# Patient Record
Sex: FEMALE | Race: BLACK OR AFRICAN AMERICAN | NOT HISPANIC OR LATINO | URBAN - METROPOLITAN AREA
[De-identification: names, ages, dates, MRNs, and addresses within clinical notes are randomized per-mention and may not be internally consistent; named-entity substitution may affect disease eponyms.]

---

## 2019-05-24 ENCOUNTER — OFFICE VISIT (OUTPATIENT)
Dept: INTERNAL MEDICINE | Facility: CLINIC | Age: 24
End: 2019-05-24

## 2019-05-24 VITALS
HEIGHT: 68 IN | DIASTOLIC BLOOD PRESSURE: 90 MMHG | RESPIRATION RATE: 16 BRPM | HEART RATE: 54 BPM | WEIGHT: 231.6 LBS | TEMPERATURE: 97.4 F | BODY MASS INDEX: 35.1 KG/M2 | OXYGEN SATURATION: 94 % | SYSTOLIC BLOOD PRESSURE: 138 MMHG

## 2019-05-24 DIAGNOSIS — R10.11 RIGHT UPPER QUADRANT ABDOMINAL PAIN: Primary | ICD-10-CM

## 2019-05-24 DIAGNOSIS — K21.9 GASTROESOPHAGEAL REFLUX DISEASE, ESOPHAGITIS PRESENCE NOT SPECIFIED: ICD-10-CM

## 2019-05-24 LAB
ALBUMIN SERPL-MCNC: 3.7 G/DL (ref 3.5–5.2)
ALBUMIN/GLOB SERPL: 1.3 G/DL
ALP SERPL-CCNC: 46 U/L (ref 39–117)
ALT SERPL W P-5'-P-CCNC: 14 U/L (ref 1–33)
ANION GAP SERPL CALCULATED.3IONS-SCNC: 13.8 MMOL/L
AST SERPL-CCNC: 15 U/L (ref 1–32)
BILIRUB SERPL-MCNC: 0.3 MG/DL (ref 0.2–1.2)
BUN BLD-MCNC: 4 MG/DL (ref 6–20)
BUN/CREAT SERPL: 4.8 (ref 7–25)
CALCIUM SPEC-SCNC: 9.3 MG/DL (ref 8.6–10.5)
CHLORIDE SERPL-SCNC: 98 MMOL/L (ref 98–107)
CO2 SERPL-SCNC: 24.2 MMOL/L (ref 22–29)
CREAT BLD-MCNC: 0.83 MG/DL (ref 0.57–1)
DEPRECATED RDW RBC AUTO: 45.6 FL (ref 37–54)
ERYTHROCYTE [DISTWIDTH] IN BLOOD BY AUTOMATED COUNT: 13.5 % (ref 12.3–15.4)
GFR SERPL CREATININE-BSD FRML MDRD: 102 ML/MIN/1.73
GLOBULIN UR ELPH-MCNC: 2.8 GM/DL
GLUCOSE BLD-MCNC: 80 MG/DL (ref 65–99)
HCT VFR BLD AUTO: 46.3 % (ref 34–46.6)
HGB BLD-MCNC: 14 G/DL (ref 12–15.9)
LIPASE SERPL-CCNC: 29 U/L (ref 13–60)
MCH RBC QN AUTO: 28.1 PG (ref 26.6–33)
MCHC RBC AUTO-ENTMCNC: 30.2 G/DL (ref 31.5–35.7)
MCV RBC AUTO: 92.8 FL (ref 79–97)
PLATELET # BLD AUTO: 270 10*3/MM3 (ref 140–450)
PMV BLD AUTO: 11.7 FL (ref 6–12)
POTASSIUM BLD-SCNC: 4.2 MMOL/L (ref 3.5–5.2)
PROT SERPL-MCNC: 6.5 G/DL (ref 6–8.5)
RBC # BLD AUTO: 4.99 10*6/MM3 (ref 3.77–5.28)
SODIUM BLD-SCNC: 136 MMOL/L (ref 136–145)
WBC NRBC COR # BLD: 5.87 10*3/MM3 (ref 3.4–10.8)

## 2019-05-24 PROCEDURE — 85027 COMPLETE CBC AUTOMATED: CPT | Performed by: NURSE PRACTITIONER

## 2019-05-24 PROCEDURE — 83690 ASSAY OF LIPASE: CPT | Performed by: NURSE PRACTITIONER

## 2019-05-24 PROCEDURE — 80053 COMPREHEN METABOLIC PANEL: CPT | Performed by: NURSE PRACTITIONER

## 2019-05-24 PROCEDURE — 99203 OFFICE O/P NEW LOW 30 MIN: CPT | Performed by: NURSE PRACTITIONER

## 2019-05-24 RX ORDER — OMEPRAZOLE 20 MG/1
20 CAPSULE, DELAYED RELEASE ORAL DAILY
COMMUNITY
End: 2019-05-24 | Stop reason: SDUPTHER

## 2019-05-24 RX ORDER — ONDANSETRON 4 MG/1
TABLET, ORALLY DISINTEGRATING ORAL
Refills: 0 | COMMUNITY
Start: 2019-05-23 | End: 2019-10-09 | Stop reason: HOSPADM

## 2019-05-24 RX ORDER — OMEPRAZOLE 20 MG/1
20 CAPSULE, DELAYED RELEASE ORAL DAILY
Qty: 30 CAPSULE | Refills: 1 | OUTPATIENT
Start: 2019-05-24 | End: 2019-10-09 | Stop reason: HOSPADM

## 2019-05-24 NOTE — PROGRESS NOTES
Chavez Manriquez is a 23 y.o. female.     Chief Complaint   Patient presents with   • Establish Care   • Abdominal Pain     sharp pain under right breast, stomach area.  2 days.  Went to [facility] ([facility]) yesterrday.  Given zofran and Prilosec       Abdominal Pain   This is a new problem. The current episode started in the past 7 days (2 days). The problem has been waxing and waning. The pain is located in the RUQ. The pain is at a severity of 5/10. The quality of the pain is colicky and cramping. The abdominal pain does not radiate. Associated symptoms include belching, flatus, nausea and vomiting ( Resolved). Pertinent negatives include no anorexia, arthralgias, constipation, diarrhea, dysuria, fever, frequency, headaches, hematochezia, hematuria, melena, myalgias or weight loss. The pain is aggravated by certain positions (walking around). Relieved by: zofran and sleep. She has tried proton pump inhibitors (zofran) for the symptoms. The treatment provided mild relief. Her past medical history is significant for GERD. There is no history of abdominal surgery, colon cancer, Crohn's disease, gallstones, irritable bowel syndrome, pancreatitis, PUD or ulcerative colitis.      The following portions of the patient's history were reviewed and updated as appropriate: allergies, current medications, past family history, past medical history, past social history, past surgical history and problem list.    Review of Systems   Constitutional: Negative for activity change, appetite change, chills, diaphoresis, fatigue, fever, unexpected weight change and weight loss.   Respiratory: Negative for cough, chest tightness, shortness of breath and wheezing.    Cardiovascular: Negative for chest pain, palpitations and leg swelling.   Gastrointestinal: Positive for abdominal pain, flatus, nausea and vomiting ( Resolved). Negative for abdominal distention, anal bleeding, anorexia, blood in stool, constipation, diarrhea,  hematochezia and melena.   Genitourinary: Negative for difficulty urinating, dysuria, frequency and hematuria.   Musculoskeletal: Negative for arthralgias, back pain and myalgias.   Skin: Negative for color change and rash.   Allergic/Immunologic: Negative for food allergies.   Neurological: Negative for dizziness, syncope, weakness and headaches.   Psychiatric/Behavioral: Negative for sleep disturbance.       Outpatient Medications Marked as Taking for the 5/24/19 encounter (Office Visit) with Che SchmidtLILIANA   Medication Sig Dispense Refill   • levonorgestrel (MIRENA) 20 MCG/24HR IUD 1 each by Intrauterine route 1 (One) Time.     • omeprazole (priLOSEC) 20 MG capsule Take 1 capsule by mouth Daily. 30 capsule 1   • ondansetron ODT (ZOFRAN-ODT) 4 MG disintegrating tablet dissolve 1 tablet ON TONGUE every 8 hours if needed  0   • [DISCONTINUED] omeprazole (priLOSEC) 20 MG capsule Take 20 mg by mouth Daily.           Objective   Physical Exam   Constitutional: She is oriented to person, place, and time. She appears well-developed and well-nourished. No distress.   HENT:   Head: Normocephalic and atraumatic.   Eyes: Conjunctivae are normal. Pupils are equal, round, and reactive to light. No scleral icterus.   Neck: Normal range of motion. Neck supple.   Cardiovascular: Normal rate, regular rhythm and normal heart sounds.   Pulmonary/Chest: Effort normal and breath sounds normal.   Abdominal: Soft. Normal appearance and bowel sounds are normal. She exhibits no distension and no mass. There is tenderness in the right upper quadrant. There is no rigidity, no rebound, no guarding, no CVA tenderness, no tenderness at McBurney's point and negative Aguilar's sign. No hernia.   Musculoskeletal: Normal range of motion.   Neurological: She is alert and oriented to person, place, and time.   Skin: Skin is warm and dry. Capillary refill takes less than 2 seconds. No rash noted. She is not diaphoretic. No erythema. No pallor.    Psychiatric: She has a normal mood and affect. Her behavior is normal. Judgment and thought content normal.   Nursing note and vitals reviewed.      Vitals:    05/24/19 0958   BP: 138/90   Pulse: 54   Resp: 16   Temp: 97.4 °F (36.3 °C)   SpO2: 94%         Assessment/Plan   Yesy was seen today for establish care and abdominal pain.    Diagnoses and all orders for this visit:    Right upper quadrant abdominal pain  -     CBC (No Diff)  -     Comprehensive Metabolic Panel  -     Lipase  -     US Gallbladder; Future    Gastroesophageal reflux disease, esophagitis presence not specified  -     omeprazole (priLOSEC) 20 MG capsule; Take 1 capsule by mouth Daily.                Continue omeprazole (she just started this yesterday).  Encourage low-fat diet.  Labs sent as above.  Will notify results and treat accordingly.  We will go ahead and check an ultrasound of her gallbladder due to right upper quadrant tenderness  Return in about 6 weeks (around 7/5/2019) for Recheck.  Or sooner with any worsening of symptoms.  Plan of care discussed with pt. They verbalized understanding and agreement.       * Please note that portions of this note were completed with a voice recognition program. Efforts were made to edit the dictation but occasionally words are erroneously transcribed.

## 2019-05-28 ENCOUNTER — TELEPHONE (OUTPATIENT)
Dept: INTERNAL MEDICINE | Facility: CLINIC | Age: 24
End: 2019-05-28

## 2019-05-28 NOTE — TELEPHONE ENCOUNTER
----- Message from LILIANA Rojas sent at 5/25/2019  8:08 AM EDT -----  Please let patient know labs looked okay

## 2019-10-09 PROCEDURE — 87798 DETECT AGENT NOS DNA AMP: CPT | Performed by: FAMILY MEDICINE

## 2019-10-09 PROCEDURE — 87801 DETECT AGNT MULT DNA AMPLI: CPT | Performed by: FAMILY MEDICINE

## 2019-10-12 ENCOUNTER — TELEPHONE (OUTPATIENT)
Dept: URGENT CARE | Facility: CLINIC | Age: 24
End: 2019-10-12

## 2019-10-12 PROCEDURE — 87491 CHLMYD TRACH DNA AMP PROBE: CPT | Performed by: NURSE PRACTITIONER

## 2019-10-12 PROCEDURE — 87661 TRICHOMONAS VAGINALIS AMPLIF: CPT | Performed by: NURSE PRACTITIONER

## 2019-10-12 PROCEDURE — 87591 N.GONORRHOEAE DNA AMP PROB: CPT | Performed by: NURSE PRACTITIONER

## 2019-10-17 ENCOUNTER — TELEPHONE (OUTPATIENT)
Dept: URGENT CARE | Facility: CLINIC | Age: 24
End: 2019-10-17

## 2020-07-21 PROCEDURE — 87801 DETECT AGNT MULT DNA AMPLI: CPT | Performed by: PHYSICIAN ASSISTANT

## 2020-07-21 PROCEDURE — 87798 DETECT AGENT NOS DNA AMP: CPT | Performed by: PHYSICIAN ASSISTANT

## 2020-07-21 PROCEDURE — 87591 N.GONORRHOEAE DNA AMP PROB: CPT | Performed by: PHYSICIAN ASSISTANT

## 2020-07-21 PROCEDURE — 87661 TRICHOMONAS VAGINALIS AMPLIF: CPT | Performed by: PHYSICIAN ASSISTANT

## 2020-07-21 PROCEDURE — 87491 CHLMYD TRACH DNA AMP PROBE: CPT | Performed by: PHYSICIAN ASSISTANT

## 2020-07-21 PROCEDURE — 87529 HSV DNA AMP PROBE: CPT | Performed by: PHYSICIAN ASSISTANT

## 2020-07-24 ENCOUNTER — TELEPHONE (OUTPATIENT)
Dept: URGENT CARE | Facility: CLINIC | Age: 25
End: 2020-07-24

## 2020-07-24 NOTE — TELEPHONE ENCOUNTER
Patient notified of lab results for negative gonorrhea and chlamydia.  She advised to go to OB/GYN for further work-up of symptoms.  Patient verbalized understanding and education done.  Patient to call with any other questions or concerns.

## 2020-07-29 ENCOUNTER — TELEPHONE (OUTPATIENT)
Dept: URGENT CARE | Facility: CLINIC | Age: 25
End: 2020-07-29

## 2020-07-29 DIAGNOSIS — B96.89 BACTERIAL VAGINITIS: Primary | ICD-10-CM

## 2020-07-29 DIAGNOSIS — N76.0 BACTERIAL VAGINITIS: Primary | ICD-10-CM

## 2020-07-29 DIAGNOSIS — A59.9 TRICHOMONIASIS: ICD-10-CM

## 2020-07-29 RX ORDER — METRONIDAZOLE 500 MG/1
500 TABLET ORAL 2 TIMES DAILY
Qty: 20 TABLET | Refills: 0 | Status: SHIPPED | OUTPATIENT
Start: 2020-07-29 | End: 2020-08-08

## 2020-07-29 NOTE — TELEPHONE ENCOUNTER
----- Message from LILIANA Chandra sent at 7/29/2020  1:17 PM EDT -----  Abstain from sexual relation one week after antibiotic treatment.  Patient's partner to also be treated before any sexual relations.

## 2020-09-16 ENCOUNTER — TELEPHONE (OUTPATIENT)
Dept: INTERNAL MEDICINE | Facility: CLINIC | Age: 25
End: 2020-09-16

## 2020-09-16 NOTE — TELEPHONE ENCOUNTER
PN that she will need an appointment.  She states she was diagnosed by [facility]. Appointment scheduled with Dr. Johnson  
PT CALLED STATING THAT SHE WAS DIAGNOSED WITH Trichomoniasis 3 WEEKS AGO.  PT STATED THAT SHE IS STARTING TO GET SYMPTOMS AGAIN EVEN AFTER SHE WAS CLEARED.  PT ASKED IF SHE NEEDED TO COME IN AND BE TESTED AGAIN.    PT CONTACT [phone]     PLEASE ADVISE     
Fever greater than (need to indicate Fahrenheit or Celsius)

## 2020-09-17 ENCOUNTER — OFFICE VISIT (OUTPATIENT)
Dept: INTERNAL MEDICINE | Facility: CLINIC | Age: 25
End: 2020-09-17

## 2020-09-17 ENCOUNTER — LAB (OUTPATIENT)
Dept: LAB | Facility: HOSPITAL | Age: 25
End: 2020-09-17

## 2020-09-17 VITALS
WEIGHT: 196.2 LBS | TEMPERATURE: 97.7 F | SYSTOLIC BLOOD PRESSURE: 126 MMHG | HEIGHT: 68 IN | OXYGEN SATURATION: 94 % | BODY MASS INDEX: 29.73 KG/M2 | DIASTOLIC BLOOD PRESSURE: 64 MMHG | HEART RATE: 88 BPM

## 2020-09-17 DIAGNOSIS — Z20.2 STD EXPOSURE: Primary | ICD-10-CM

## 2020-09-17 PROCEDURE — 87491 CHLMYD TRACH DNA AMP PROBE: CPT | Performed by: FAMILY MEDICINE

## 2020-09-17 PROCEDURE — 96372 THER/PROPH/DIAG INJ SC/IM: CPT | Performed by: FAMILY MEDICINE

## 2020-09-17 PROCEDURE — 87661 TRICHOMONAS VAGINALIS AMPLIF: CPT | Performed by: FAMILY MEDICINE

## 2020-09-17 PROCEDURE — 87591 N.GONORRHOEAE DNA AMP PROB: CPT | Performed by: FAMILY MEDICINE

## 2020-09-17 PROCEDURE — 99214 OFFICE O/P EST MOD 30 MIN: CPT | Performed by: FAMILY MEDICINE

## 2020-09-17 RX ORDER — CEFTRIAXONE SODIUM 250 MG/1
250 INJECTION, POWDER, FOR SOLUTION INTRAMUSCULAR; INTRAVENOUS ONCE
Status: COMPLETED | OUTPATIENT
Start: 2020-09-17 | End: 2020-09-17

## 2020-09-17 RX ORDER — AZITHROMYCIN 500 MG/1
1000 TABLET, FILM COATED ORAL ONCE
Qty: 2 TABLET | Refills: 0 | Status: SHIPPED | OUTPATIENT
Start: 2020-09-17 | End: 2020-09-17

## 2020-09-17 RX ADMIN — CEFTRIAXONE SODIUM 250 MG: 250 INJECTION, POWDER, FOR SOLUTION INTRAMUSCULAR; INTRAVENOUS at 08:30

## 2020-09-17 NOTE — PATIENT INSTRUCTIONS
Chlamydia, Female    Chlamydia is a STD (sexually transmitted disease). This is an infection that spreads through sexual contact. If it is not treated, it can cause serious problems. It must be treated with antibiotic medicine.  If this infection is not treated and you are pregnant or become pregnant, your baby could get it during delivery. This may cause bad health problems for the baby.  Sometimes, you may not have symptoms (asymptomatic). When you have symptoms, they can include:  · Burning when you pee (urinate).  · Peeing often.  · Fluid (discharge) coming from the vagina.  · Redness, soreness, and swelling (inflammation) of the butt (rectum).  · Bleeding or fluid coming from the butt.  · Belly (abdominal) pain.  · Pain during sex.  · Bleeding between periods.  · Itching, burning, or redness in the eyes.  · Fluid coming from the eyes.  Follow these instructions at home:  Medicines  · Take over-the-counter and prescription medicines only as told by your doctor.  · Take your antibiotic medicine as told by your doctor. Do not stop taking the antibiotic even if you start to feel better.  Sexual activity  · Tell sex partners about your infection. Sex partners are people you had oral, anal, or vaginal sex with within 60 days of when you started getting sick. They need treatment, too.  · Do not have sex until:  ? You and your sex partners have been treated.  ? Your doctor says it is okay.  · If you have a single dose treatment, wait 7 days before having sex.  General instructions  · It is up to you to get your test results. Ask your doctor when your results will be ready.  · Get a lot of rest.  · Eat healthy foods.  · Drink enough fluid to keep your pee (urine) clear or pale yellow.  · Keep all follow-up visits as told by your doctor. You may need tests after 3 months.  Preventing chlamydia  · The only way to prevent chlamydia is not to have sex. To lower your risk:  ? Use latex condoms correctly. Do this every time  you have sex.  ? Avoid having many sex partners.  ? Ask if your partner has been tested for STDs and if he or she had negative results.  Contact a doctor if:  · You get new symptoms.  · You do not get better with treatment.  · You have a fever or chills.  · You have pain during sex.  Get help right away if:  · Your pain gets worse and does not get better with medicine.  · You get flu-like symptoms, such as:  ? Night sweats.  ? Sore throat.  ? Muscle aches.  · You feel sick to your stomach (nauseous).  · You throw up (vomit).  · You have trouble swallowing.  · You have bleeding:  ? Between periods.  ? After sex.  · You have irregular periods.  · You have belly pain that does not get better with medicine.  · You have lower back pain that does not get better with medicine.  · You feel weak or dizzy.  · You pass out (faint).  · You are pregnant and you get symptoms of chlamydia.  Summary  · Chlamydia is an infection that spreads through sexual contact.  · Sometimes, chlamydia can cause no symptoms (asymptomatic).  · Do not have sex until your doctor says it is okay.  · All sex partners will have to be treated for chlamydia.  This information is not intended to replace advice given to you by your health care provider. Make sure you discuss any questions you have with your health care provider.  Document Released: 09/26/2009 Document Revised: 06/11/2019 Document Reviewed: 12/07/2017  [employer] Patient Education © 2020 [employer]

## 2020-09-17 NOTE — PROGRESS NOTES
Subjective     Yesy Manriquez is a 24 y.o. female.     Chief Complaint   Patient presents with   • Exposure to STD     wants to be checked for STD's       History of Present Illness     Pt c/o vag discharge , yellow in color for 1 week with burning sensation , no frequency or dysuria.  Her sx getting worse since yesterday.   No use of condom  She has h/o STD in the past , was Rx   She has new partner since 2 months, he is the one who gave her many STD.  Denies F,C,or abd pain    The following portions of the patient's history were reviewed and updated as appropriate: allergies, current medications, past family history, past medical history, past social history, past surgical history and problem list.    Review of Systems   Constitutional: Negative for activity change, appetite change, chills and fever.   HENT: Negative for congestion, ear discharge, ear pain, rhinorrhea, sore throat and trouble swallowing.    Eyes: Negative for discharge and visual disturbance.   Respiratory: Negative for apnea, cough, chest tightness, shortness of breath, wheezing and stridor.    Gastrointestinal: Negative for abdominal distention, abdominal pain, nausea, vomiting and GERD.   Genitourinary: Positive for vaginal discharge. Negative for decreased urine volume, difficulty urinating, dysuria, frequency, menstrual problem, pelvic pain, pelvic pressure, urgency, urinary incontinence, vaginal bleeding and vaginal pain.   Skin: Negative for color change, pallor and rash.   Neurological: Negative for seizures, speech difficulty and headache.   Psychiatric/Behavioral: Negative for agitation, behavioral problems and sleep disturbance. The patient is nervous/anxious.    All other systems reviewed and are negative.    Wt Readings from Last 3 Encounters:   09/17/20 89 kg (196 lb 3.2 oz)   10/09/19 95.3 kg (210 lb)   05/24/19 105 kg (231 lb 9.6 oz)     Temp Readings from Last 3 Encounters:   09/17/20 97.7 °F (36.5 °C) (Infrared)   07/21/20 98.3  °F (36.8 °C)   10/09/19 98 °F (36.7 °C) (Temporal)     BP Readings from Last 3 Encounters:   09/17/20 126/64   07/21/20 134/81   10/09/19 122/66     Pulse Readings from Last 3 Encounters:   09/17/20 88   07/21/20 57   10/09/19 59                 09/17/20  0752   Weight: 89 kg (196 lb 3.2 oz)         Body mass index is 29.83 kg/m².      Current Outpatient Medications   Medication Sig Dispense Refill   • levonorgestrel (MIRENA) 20 MCG/24HR IUD 1 each by Intrauterine route 1 (One) Time.     • azithromycin (Zithromax) 500 MG tablet Take 2 tablets by mouth 1 (One) Time for 1 dose. 2 tablet 0   • fluconazole (DIFLUCAN) 150 MG tablet May take 1 tablet at the start of yeast infection symptoms. May take 2nd tablet in 3 days if symptoms persist. 2 tablet 0     No current facility-administered medications for this visit.        Objective   Physical Exam  Vitals signs and nursing note reviewed.   Constitutional:       General: She is not in acute distress.     Appearance: She is well-developed. She is not ill-appearing or diaphoretic.   HENT:      Head: Normocephalic.   Eyes:      Conjunctiva/sclera: Conjunctivae normal.   Neck:      Musculoskeletal: Normal range of motion.      Thyroid: No thyromegaly.   Cardiovascular:      Rate and Rhythm: Normal rate and regular rhythm.      Heart sounds: Normal heart sounds. No murmur.   Pulmonary:      Effort: Pulmonary effort is normal. No respiratory distress.      Breath sounds: Normal breath sounds. No wheezing or rales.   Chest:      Chest wall: No tenderness.   Abdominal:      General: Bowel sounds are normal. There is no distension.      Palpations: Abdomen is soft. There is no mass.      Tenderness: There is no abdominal tenderness. There is no guarding or rebound.      Hernia: No hernia is present.   Lymphadenopathy:      Cervical: No cervical adenopathy.   Skin:     General: Skin is warm.      Coloration: Skin is not pale.      Findings: No erythema or rash.   Neurological:       Mental Status: She is alert and oriented to person, place, and time.      Motor: No abnormal muscle tone.      Deep Tendon Reflexes: Reflexes normal.   Psychiatric:         Mood and Affect: Mood normal.         Behavior: Behavior normal.         Thought Content: Thought content normal.         Judgment: Judgment normal.           Assessment/Plan   Yesy was seen today for exposure to std.    Diagnoses and all orders for this visit:    STD exposure;  - As she has worsening sx with h/o STD and her partner has it ,Will start Rx now , she agreed.   -     Chlamydia trachomatis, Neisseria gonorrhoeae, Trichomonas vaginalis, PCR - Urine, Urine, Clean Catch  -     cefTRIAXone (ROCEPHIN) injection 250 mg  -     azithromycin (Zithromax) 500 MG tablet; Take 2 tablets by mouth 1 (One) Time for 1 dose.  - Discussed in length about safe sex    I have fully discussed the nature of the medical condition(s) risks, complications, management, safe and proper use of medications.   I have discussed the SIDE EFFECT OF MEDICATION and importance TO report any side effect , the patient expressed good understanding.  Encouraged medication compliance and the importance of keeping scheduled follow up appointments with me and any other providers.    Patient instructed to follow up with our office for results on any labs/imaging ordered during this visit.    Home care discussed    All questions answered  Patient verbalizes understanding and agrees to treatment plan.

## 2020-09-20 LAB
C TRACH RRNA SPEC QL NAA+PROBE: NEGATIVE
N GONORRHOEA RRNA SPEC QL NAA+PROBE: POSITIVE
T VAGINALIS DNA SPEC QL NAA+PROBE: NEGATIVE

## 2020-09-22 ENCOUNTER — OFFICE VISIT (OUTPATIENT)
Dept: INTERNAL MEDICINE | Facility: CLINIC | Age: 25
End: 2020-09-22

## 2020-09-22 ENCOUNTER — LAB (OUTPATIENT)
Dept: LAB | Facility: HOSPITAL | Age: 25
End: 2020-09-22

## 2020-09-22 ENCOUNTER — TELEPHONE (OUTPATIENT)
Dept: INTERNAL MEDICINE | Facility: CLINIC | Age: 25
End: 2020-09-22

## 2020-09-22 VITALS
TEMPERATURE: 99.2 F | WEIGHT: 195.6 LBS | DIASTOLIC BLOOD PRESSURE: 68 MMHG | HEIGHT: 68 IN | BODY MASS INDEX: 29.64 KG/M2 | SYSTOLIC BLOOD PRESSURE: 124 MMHG

## 2020-09-22 DIAGNOSIS — N89.8 VAGINAL DISCHARGE: ICD-10-CM

## 2020-09-22 DIAGNOSIS — Z72.51 HIGH RISK HETEROSEXUAL BEHAVIOR: ICD-10-CM

## 2020-09-22 DIAGNOSIS — L02.92 BOIL: Primary | ICD-10-CM

## 2020-09-22 PROCEDURE — 87801 DETECT AGNT MULT DNA AMPLI: CPT

## 2020-09-22 PROCEDURE — 87591 N.GONORRHOEAE DNA AMP PROB: CPT

## 2020-09-22 PROCEDURE — 87661 TRICHOMONAS VAGINALIS AMPLIF: CPT

## 2020-09-22 PROCEDURE — 87491 CHLMYD TRACH DNA AMP PROBE: CPT

## 2020-09-22 PROCEDURE — 87798 DETECT AGENT NOS DNA AMP: CPT

## 2020-09-22 PROCEDURE — 99214 OFFICE O/P EST MOD 30 MIN: CPT | Performed by: FAMILY MEDICINE

## 2020-09-22 RX ORDER — CEPHALEXIN 250 MG/1
250 CAPSULE ORAL 3 TIMES DAILY
Qty: 21 CAPSULE | Refills: 0 | Status: SHIPPED | OUTPATIENT
Start: 2020-09-22 | End: 2020-09-29

## 2020-09-22 NOTE — TELEPHONE ENCOUNTER
PN of results.Shes stated understanding.  She says she has a bump in her vaginal area.  Appointment scheduled to evaluate

## 2020-09-22 NOTE — PROGRESS NOTES
Subjective     Yesy Manriquez is a 24 y.o. female.     Chief Complaint   Patient presents with   • vaginal bump     has a bump on her vagina she would like checked out, she had noticied in the past 2 days   • Vaginal Discharge       Vaginal Discharge  The patient's primary symptoms include genital lesions, a genital odor and vaginal discharge. The patient's pertinent negatives include no genital itching, genital rash, missed menses, pelvic pain or vaginal bleeding. This is a recurrent problem. The current episode started yesterday. The problem occurs constantly. The problem has been unchanged. She is not pregnant. Pertinent negatives include no abdominal pain, back pain, chills, constipation, diarrhea, discolored urine, dysuria, fever, flank pain, frequency, headaches, hematuria, joint pain, joint swelling, nausea, painful intercourse, rash, sore throat, urgency or vomiting. The vaginal discharge was malodorous, yellow and thick. There has been no bleeding. The symptoms are aggravated by intercourse. She has tried nothing for the symptoms. She is sexually active with multiple partners (she has new partner ). It is unknown whether or not her partner has an STD. Her past medical history is significant for an STD.   last week had GC, was Rx   No use for condom    She is also c/o bump on the Rt lower side of the vulva for 2 days , no oozing , hurts to touch , getting bigger    The following portions of the patient's history were reviewed and updated as appropriate: allergies, current medications, past family history, past medical history, past social history, past surgical history and problem list.    Review of Systems   Constitutional: Negative for activity change, appetite change, chills and fever.   HENT: Negative for sore throat.    Cardiovascular: Negative for chest pain, palpitations and leg swelling.   Gastrointestinal: Negative for abdominal pain, constipation, diarrhea, nausea and vomiting.   Genitourinary:  Positive for vaginal discharge. Negative for dysuria, flank pain, frequency, hematuria, missed menses, pelvic pain and urgency.        Vulval bump    Musculoskeletal: Negative for arthralgias, back pain and joint pain.   Skin: Negative for rash.   Psychiatric/Behavioral: Negative for agitation, behavioral problems, decreased concentration, sleep disturbance and suicidal ideas. The patient is nervous/anxious.    All other systems reviewed and are negative.      Vitals:    09/22/20 1331   BP: 124/68   Temp: 99.2 °F (37.3 °C)           09/22/20  1331   Weight: 88.7 kg (195 lb 9.6 oz)         Body mass index is 29.74 kg/m².      Current Outpatient Medications   Medication Sig Dispense Refill   • cephalexin (Keflex) 250 MG capsule Take 1 capsule by mouth 3 (Three) Times a Day for 7 days. 21 capsule 0   • levonorgestrel (MIRENA) 20 MCG/24HR IUD 1 each by Intrauterine route 1 (One) Time.       No current facility-administered medications for this visit.                 Objective   Physical Exam  Vitals signs and nursing note reviewed. Exam conducted with a chaperone present.   Constitutional:       Appearance: Normal appearance. She is not ill-appearing or diaphoretic.   Abdominal:      General: Bowel sounds are normal. There is no distension.      Palpations: Abdomen is soft. There is no mass.      Tenderness: There is no abdominal tenderness. There is no right CVA tenderness, left CVA tenderness or guarding.      Hernia: No hernia is present.   Genitourinary:     Pubic Area: No rash.       Labia:         Right: Tenderness and lesion present. No rash or injury.         Left: No rash, tenderness, lesion or injury.       Vagina: Vaginal discharge present. No erythema, tenderness or bleeding.      Cervix: Discharge present. No cervical bleeding.      Comments: ~ 1 x 1 cm firm nodule on the Rt lower end of the vulva with TTP  Musculoskeletal: Normal range of motion.   Skin:     General: Skin is warm.      Coloration: Skin is  not pale.      Findings: No erythema or rash.   Neurological:      Mental Status: She is alert and oriented to person, place, and time.   Psychiatric:         Mood and Affect: Mood normal.         Behavior: Behavior normal.         Thought Content: Thought content normal.         Judgment: Judgment normal.           Assessment/Plan   Yesy was seen today for vaginal bump and vaginal discharge.    Diagnoses and all orders for this visit:    Boil  -     cephalexin (Keflex) 250 MG capsule; Take 1 capsule by mouth 3 (Three) Times a Day for 7 days.    Vaginal discharge;  - DDx; GC vs Chlamydia vs Trich vs yeast vs BV vs others   -     NuSwab VG+ - Swab, Vagina; Future    High risk heterosexual behavior;  - Discussed in length about safe sex , proper use of condom       I have fully discussed the nature of the medical condition(s) risks, complications, management, safe and proper use of medications.   I have discussed the SIDE EFFECT OF MEDICATION and importance TO report any side effect , the patient expressed good understanding.  Encouraged medication compliance and the importance of keeping scheduled follow up appointments with me and any other providers.    Patient instructed to follow up with our office for results on any labs/imaging ordered during this visit.    Home care discussed  All questions answered  Patient verbalizes understanding and agrees to treatment plan.

## 2020-09-22 NOTE — TELEPHONE ENCOUNTER
----- Message from Bee Johnson MD sent at 9/20/2020 10:11 PM EDT -----  Please call, labs results showed +ve Gonorrhea , pt already treated during his last OV   Her partner needs to be treated as well.    Bee Johnson MD

## 2020-09-27 LAB
A VAGINAE DNA VAG QL NAA+PROBE: ABNORMAL SCORE
BVAB2 DNA VAG QL NAA+PROBE: ABNORMAL SCORE
C ALBICANS DNA VAG QL NAA+PROBE: NEGATIVE
C GLABRATA DNA VAG QL NAA+PROBE: NEGATIVE
C TRACH DNA VAG QL NAA+PROBE: NEGATIVE
MEGA1 DNA VAG QL NAA+PROBE: ABNORMAL SCORE
N GONORRHOEA DNA VAG QL NAA+PROBE: NEGATIVE
T VAGINALIS DNA VAG QL NAA+PROBE: NEGATIVE

## 2020-09-27 RX ORDER — METRONIDAZOLE 500 MG/1
500 TABLET ORAL 2 TIMES DAILY
Qty: 14 TABLET | Refills: 0 | Status: SHIPPED | OUTPATIENT
Start: 2020-09-27 | End: 2020-11-29

## 2020-11-18 PROCEDURE — 87661 TRICHOMONAS VAGINALIS AMPLIF: CPT | Performed by: PHYSICIAN ASSISTANT

## 2020-11-18 PROCEDURE — 87529 HSV DNA AMP PROBE: CPT | Performed by: PHYSICIAN ASSISTANT

## 2020-11-18 PROCEDURE — 87798 DETECT AGENT NOS DNA AMP: CPT | Performed by: PHYSICIAN ASSISTANT

## 2020-11-18 PROCEDURE — 87491 CHLMYD TRACH DNA AMP PROBE: CPT | Performed by: PHYSICIAN ASSISTANT

## 2020-11-18 PROCEDURE — 87591 N.GONORRHOEAE DNA AMP PROB: CPT | Performed by: PHYSICIAN ASSISTANT

## 2020-11-18 PROCEDURE — 87801 DETECT AGNT MULT DNA AMPLI: CPT | Performed by: PHYSICIAN ASSISTANT

## 2021-03-14 PROCEDURE — 87798 DETECT AGENT NOS DNA AMP: CPT | Performed by: NURSE PRACTITIONER

## 2021-03-14 PROCEDURE — 87491 CHLMYD TRACH DNA AMP PROBE: CPT | Performed by: NURSE PRACTITIONER

## 2021-03-14 PROCEDURE — 87591 N.GONORRHOEAE DNA AMP PROB: CPT | Performed by: NURSE PRACTITIONER

## 2021-03-14 PROCEDURE — 87801 DETECT AGNT MULT DNA AMPLI: CPT | Performed by: NURSE PRACTITIONER

## 2021-03-14 PROCEDURE — 87661 TRICHOMONAS VAGINALIS AMPLIF: CPT | Performed by: NURSE PRACTITIONER

## 2021-07-31 PROCEDURE — 87798 DETECT AGENT NOS DNA AMP: CPT | Performed by: PHYSICIAN ASSISTANT

## 2021-07-31 PROCEDURE — 87661 TRICHOMONAS VAGINALIS AMPLIF: CPT | Performed by: PHYSICIAN ASSISTANT

## 2021-07-31 PROCEDURE — 87491 CHLMYD TRACH DNA AMP PROBE: CPT | Performed by: PHYSICIAN ASSISTANT

## 2021-07-31 PROCEDURE — 87591 N.GONORRHOEAE DNA AMP PROB: CPT | Performed by: PHYSICIAN ASSISTANT

## 2021-07-31 PROCEDURE — 87801 DETECT AGNT MULT DNA AMPLI: CPT | Performed by: PHYSICIAN ASSISTANT

## 2021-07-31 PROCEDURE — 87529 HSV DNA AMP PROBE: CPT | Performed by: PHYSICIAN ASSISTANT

## 2022-03-19 PROCEDURE — 87798 DETECT AGENT NOS DNA AMP: CPT | Performed by: NURSE PRACTITIONER

## 2022-03-19 PROCEDURE — 87801 DETECT AGNT MULT DNA AMPLI: CPT | Performed by: NURSE PRACTITIONER

## 2022-03-19 PROCEDURE — 87591 N.GONORRHOEAE DNA AMP PROB: CPT | Performed by: NURSE PRACTITIONER

## 2022-03-19 PROCEDURE — 87491 CHLMYD TRACH DNA AMP PROBE: CPT | Performed by: NURSE PRACTITIONER

## 2022-03-19 PROCEDURE — 87661 TRICHOMONAS VAGINALIS AMPLIF: CPT | Performed by: NURSE PRACTITIONER

## 2022-09-06 PROCEDURE — 87798 DETECT AGENT NOS DNA AMP: CPT | Performed by: FAMILY MEDICINE

## 2022-09-06 PROCEDURE — 87661 TRICHOMONAS VAGINALIS AMPLIF: CPT | Performed by: FAMILY MEDICINE

## 2022-09-06 PROCEDURE — 87491 CHLMYD TRACH DNA AMP PROBE: CPT | Performed by: FAMILY MEDICINE

## 2022-09-06 PROCEDURE — 87801 DETECT AGNT MULT DNA AMPLI: CPT | Performed by: FAMILY MEDICINE

## 2022-09-06 PROCEDURE — 87529 HSV DNA AMP PROBE: CPT | Performed by: FAMILY MEDICINE

## 2022-09-06 PROCEDURE — 87591 N.GONORRHOEAE DNA AMP PROB: CPT | Performed by: FAMILY MEDICINE

## 2022-09-12 ENCOUNTER — TELEPHONE (OUTPATIENT)
Dept: URGENT CARE | Facility: CLINIC | Age: 27
End: 2022-09-12

## 2022-09-12 NOTE — TELEPHONE ENCOUNTER
Patient seen in clinic by another provider, vaginal swab positive for trichomonas and associated bacterial vaginosis.  She will be treated with metronidazole.  Patient be notified of her results and plan of care by clinical staff.

## 2024-08-12 PROCEDURE — 87661 TRICHOMONAS VAGINALIS AMPLIF: CPT | Performed by: FAMILY MEDICINE

## 2024-08-12 PROCEDURE — 87798 DETECT AGENT NOS DNA AMP: CPT | Performed by: FAMILY MEDICINE

## 2024-08-12 PROCEDURE — 87529 HSV DNA AMP PROBE: CPT | Performed by: FAMILY MEDICINE

## 2024-08-12 PROCEDURE — 87086 URINE CULTURE/COLONY COUNT: CPT | Performed by: FAMILY MEDICINE

## 2024-08-12 PROCEDURE — 87801 DETECT AGNT MULT DNA AMPLI: CPT | Performed by: FAMILY MEDICINE

## 2024-08-12 PROCEDURE — 87491 CHLMYD TRACH DNA AMP PROBE: CPT | Performed by: FAMILY MEDICINE

## 2024-08-12 PROCEDURE — 87591 N.GONORRHOEAE DNA AMP PROB: CPT | Performed by: FAMILY MEDICINE

## 2024-08-13 ENCOUNTER — PATIENT ROUNDING (BHMG ONLY) (OUTPATIENT)
Dept: URGENT CARE | Facility: CLINIC | Age: 29
End: 2024-08-13

## 2024-08-17 DIAGNOSIS — N76.0 BACTERIAL VAGINOSIS: Primary | ICD-10-CM

## 2024-08-17 DIAGNOSIS — B96.89 BACTERIAL VAGINOSIS: Primary | ICD-10-CM

## 2024-08-17 RX ORDER — METRONIDAZOLE 500 MG/1
500 TABLET ORAL 2 TIMES DAILY
Qty: 14 TABLET | Refills: 0 | Status: SHIPPED | OUTPATIENT
Start: 2024-08-17 | End: 2024-08-24